# Patient Record
Sex: FEMALE | Race: WHITE | NOT HISPANIC OR LATINO | Employment: OTHER | URBAN - METROPOLITAN AREA
[De-identification: names, ages, dates, MRNs, and addresses within clinical notes are randomized per-mention and may not be internally consistent; named-entity substitution may affect disease eponyms.]

---

## 2017-05-05 ENCOUNTER — NEW PATIENT (OUTPATIENT)
Dept: URBAN - METROPOLITAN AREA CLINIC 26 | Facility: CLINIC | Age: 82
End: 2017-05-05

## 2017-05-05 VITALS
HEART RATE: 60 BPM | BODY MASS INDEX: 24.14 KG/M2 | HEIGHT: 58 IN | DIASTOLIC BLOOD PRESSURE: 72 MMHG | SYSTOLIC BLOOD PRESSURE: 112 MMHG | WEIGHT: 115 LBS

## 2017-05-05 DIAGNOSIS — H35.61: ICD-10-CM

## 2017-05-05 DIAGNOSIS — H25.9: ICD-10-CM

## 2017-05-05 DIAGNOSIS — H04.123: ICD-10-CM

## 2017-05-05 DIAGNOSIS — H35.3122: ICD-10-CM

## 2017-05-05 DIAGNOSIS — H43.393: ICD-10-CM

## 2017-05-05 DIAGNOSIS — H35.3211: ICD-10-CM

## 2017-05-05 PROCEDURE — 92004 COMPRE OPH EXAM NEW PT 1/>: CPT

## 2017-05-05 PROCEDURE — 92250 FUNDUS PHOTOGRAPHY W/I&R: CPT

## 2017-05-05 ASSESSMENT — TONOMETRY
OS_IOP_MMHG: 14
OD_IOP_MMHG: 10

## 2017-05-05 ASSESSMENT — VISUAL ACUITY
OD_SC: CF 1FT
OS_SC: 20/200

## 2017-05-09 ENCOUNTER — CLINICAL PROCEDURE AND DIAGNOSTIC TESTING ONLY (OUTPATIENT)
Dept: URBAN - METROPOLITAN AREA CLINIC 26 | Facility: CLINIC | Age: 82
End: 2017-05-09

## 2017-05-09 DIAGNOSIS — H35.3231: ICD-10-CM

## 2017-05-09 PROCEDURE — 92134 CPTRZ OPH DX IMG PST SGM RTA: CPT

## 2017-05-09 PROCEDURE — 67028 INJECTION EYE DRUG: CPT

## 2017-05-09 ASSESSMENT — TONOMETRY
OS_IOP_MMHG: 18
OD_IOP_MMHG: 14

## 2017-05-09 ASSESSMENT — VISUAL ACUITY
OS_SC: 20/200
OD_SC: CF 1FT

## 2017-06-02 ENCOUNTER — CLINIC PROCEDURE ONLY (OUTPATIENT)
Dept: URBAN - METROPOLITAN AREA CLINIC 26 | Facility: CLINIC | Age: 82
End: 2017-06-02

## 2017-06-02 DIAGNOSIS — H35.3231: ICD-10-CM

## 2017-06-02 PROCEDURE — 67028 INJECTION EYE DRUG: CPT

## 2017-06-02 ASSESSMENT — VISUAL ACUITY
OS_SC: 20/400
OD_SC: CF 3FT

## 2017-06-02 ASSESSMENT — TONOMETRY
OS_IOP_MMHG: 12
OD_IOP_MMHG: 13

## 2017-07-07 ENCOUNTER — CLINICAL PROCEDURE AND DIAGNOSTIC TESTING ONLY (OUTPATIENT)
Dept: URBAN - METROPOLITAN AREA CLINIC 26 | Facility: CLINIC | Age: 82
End: 2017-07-07

## 2017-07-07 DIAGNOSIS — H35.3231: ICD-10-CM

## 2017-07-07 PROCEDURE — 92134 CPTRZ OPH DX IMG PST SGM RTA: CPT

## 2017-07-07 PROCEDURE — 67028 INJECTION EYE DRUG: CPT

## 2017-07-07 ASSESSMENT — TONOMETRY
OD_IOP_MMHG: 12
OS_IOP_MMHG: 14

## 2017-07-07 ASSESSMENT — VISUAL ACUITY
OS_SC: 20/200
OD_SC: CF 1FT

## 2017-08-11 ENCOUNTER — FOLLOW UP (OUTPATIENT)
Dept: URBAN - METROPOLITAN AREA CLINIC 26 | Facility: CLINIC | Age: 82
End: 2017-08-11

## 2017-08-11 VITALS — HEIGHT: 55 IN | HEART RATE: 68 BPM | DIASTOLIC BLOOD PRESSURE: 84 MMHG | SYSTOLIC BLOOD PRESSURE: 122 MMHG

## 2017-08-11 DIAGNOSIS — H25.9: ICD-10-CM

## 2017-08-11 DIAGNOSIS — H04.123: ICD-10-CM

## 2017-08-11 DIAGNOSIS — H35.61: ICD-10-CM

## 2017-08-11 DIAGNOSIS — H43.393: ICD-10-CM

## 2017-08-11 DIAGNOSIS — H35.3231: ICD-10-CM

## 2017-08-11 PROCEDURE — 92014 COMPRE OPH EXAM EST PT 1/>: CPT

## 2017-08-11 PROCEDURE — 92250 FUNDUS PHOTOGRAPHY W/I&R: CPT

## 2017-08-11 ASSESSMENT — TONOMETRY
OD_IOP_MMHG: 12
OS_IOP_MMHG: 14

## 2017-08-11 ASSESSMENT — VISUAL ACUITY: OS_SC: 20/400

## 2017-09-08 ENCOUNTER — CLINICAL PROCEDURE AND DIAGNOSTIC TESTING ONLY (OUTPATIENT)
Dept: URBAN - METROPOLITAN AREA CLINIC 26 | Facility: CLINIC | Age: 82
End: 2017-09-08

## 2017-09-08 DIAGNOSIS — H35.3231: ICD-10-CM

## 2017-09-08 PROCEDURE — 92134 CPTRZ OPH DX IMG PST SGM RTA: CPT

## 2017-09-08 PROCEDURE — 67028 INJECTION EYE DRUG: CPT

## 2017-09-08 ASSESSMENT — TONOMETRY
OS_IOP_MMHG: 12
OD_IOP_MMHG: 11

## 2017-09-08 ASSESSMENT — VISUAL ACUITY: OS_SC: 20/200

## 2017-11-09 ENCOUNTER — CLINICAL PROCEDURE AND DIAGNOSTIC TESTING ONLY (OUTPATIENT)
Dept: URBAN - METROPOLITAN AREA CLINIC 26 | Facility: CLINIC | Age: 82
End: 2017-11-09

## 2017-11-09 DIAGNOSIS — H35.3231: ICD-10-CM

## 2017-11-09 PROCEDURE — 67028 INJECTION EYE DRUG: CPT

## 2017-11-09 PROCEDURE — 92134 CPTRZ OPH DX IMG PST SGM RTA: CPT

## 2017-11-09 ASSESSMENT — VISUAL ACUITY
OS_SC: 20/80-1
OD_SC: CF 1FT

## 2017-11-09 ASSESSMENT — TONOMETRY
OS_IOP_MMHG: 13
OD_IOP_MMHG: 11

## 2018-01-12 ENCOUNTER — CLINICAL PROCEDURE AND DIAGNOSTIC TESTING ONLY (OUTPATIENT)
Dept: URBAN - METROPOLITAN AREA CLINIC 26 | Facility: CLINIC | Age: 83
End: 2018-01-12

## 2018-01-12 DIAGNOSIS — H35.3231: ICD-10-CM

## 2018-01-12 PROCEDURE — 92250 FUNDUS PHOTOGRAPHY W/I&R: CPT

## 2018-01-12 PROCEDURE — 67028 INJECTION EYE DRUG: CPT

## 2018-01-12 ASSESSMENT — TONOMETRY
OS_IOP_MMHG: 14
OD_IOP_MMHG: 13

## 2018-01-12 ASSESSMENT — VISUAL ACUITY
OD_SC: CF 2FT
OS_SC: 20/200

## 2018-03-16 ENCOUNTER — FOLLOW UP AND POST INJECTION EVALUATION (OUTPATIENT)
Dept: URBAN - METROPOLITAN AREA CLINIC 26 | Facility: CLINIC | Age: 83
End: 2018-03-16

## 2018-03-16 VITALS
BODY MASS INDEX: 22.58 KG/M2 | DIASTOLIC BLOOD PRESSURE: 80 MMHG | HEART RATE: 75 BPM | WEIGHT: 115 LBS | SYSTOLIC BLOOD PRESSURE: 158 MMHG | HEIGHT: 60 IN

## 2018-03-16 DIAGNOSIS — H35.3231: ICD-10-CM

## 2018-03-16 DIAGNOSIS — H35.61: ICD-10-CM

## 2018-03-16 DIAGNOSIS — H43.393: ICD-10-CM

## 2018-03-16 PROCEDURE — 92250 FUNDUS PHOTOGRAPHY W/I&R: CPT

## 2018-03-16 PROCEDURE — 92014 COMPRE OPH EXAM EST PT 1/>: CPT

## 2018-03-16 PROCEDURE — 92134 CPTRZ OPH DX IMG PST SGM RTA: CPT

## 2018-03-16 ASSESSMENT — TONOMETRY
OD_IOP_MMHG: 11
OS_IOP_MMHG: 15

## 2018-03-16 ASSESSMENT — VISUAL ACUITY
OS_SC: 20/200+2
OD_SC: CF 1FT

## 2018-05-04 ENCOUNTER — FOLLOW UP (OUTPATIENT)
Dept: URBAN - METROPOLITAN AREA CLINIC 26 | Facility: CLINIC | Age: 83
End: 2018-05-04

## 2018-05-04 VITALS — HEART RATE: 71 BPM | HEIGHT: 55 IN | DIASTOLIC BLOOD PRESSURE: 60 MMHG

## 2018-05-04 DIAGNOSIS — H43.393: ICD-10-CM

## 2018-05-04 DIAGNOSIS — H35.61: ICD-10-CM

## 2018-05-04 DIAGNOSIS — H35.3231: ICD-10-CM

## 2018-05-04 PROCEDURE — 92250 FUNDUS PHOTOGRAPHY W/I&R: CPT

## 2018-05-04 PROCEDURE — 92012 INTRM OPH EXAM EST PATIENT: CPT

## 2018-05-04 ASSESSMENT — VISUAL ACUITY: OS_SC: 20/200+1

## 2018-05-04 ASSESSMENT — TONOMETRY
OS_IOP_MMHG: 15
OD_IOP_MMHG: 12

## 2018-07-06 ENCOUNTER — FOLLOW UP (OUTPATIENT)
Dept: URBAN - METROPOLITAN AREA CLINIC 26 | Facility: CLINIC | Age: 83
End: 2018-07-06

## 2018-07-06 DIAGNOSIS — H35.3231: ICD-10-CM

## 2018-07-06 DIAGNOSIS — H43.393: ICD-10-CM

## 2018-07-06 DIAGNOSIS — H35.61: ICD-10-CM

## 2018-07-06 PROCEDURE — 92014 COMPRE OPH EXAM EST PT 1/>: CPT

## 2018-07-06 PROCEDURE — 92250 FUNDUS PHOTOGRAPHY W/I&R: CPT

## 2018-07-06 ASSESSMENT — VISUAL ACUITY
OD_SC: CF 2FT
OS_SC: 20/200

## 2018-07-06 ASSESSMENT — TONOMETRY
OD_IOP_MMHG: 12
OS_IOP_MMHG: 14

## 2018-10-05 ENCOUNTER — FOLLOW UP (OUTPATIENT)
Dept: URBAN - METROPOLITAN AREA CLINIC 26 | Facility: CLINIC | Age: 83
End: 2018-10-05

## 2018-10-05 DIAGNOSIS — H43.393: ICD-10-CM

## 2018-10-05 DIAGNOSIS — H35.3231: ICD-10-CM

## 2018-10-05 DIAGNOSIS — H35.61: ICD-10-CM

## 2018-10-05 PROCEDURE — 92250 FUNDUS PHOTOGRAPHY W/I&R: CPT

## 2018-10-05 PROCEDURE — 92014 COMPRE OPH EXAM EST PT 1/>: CPT

## 2018-10-05 ASSESSMENT — TONOMETRY
OD_IOP_MMHG: 10
OS_IOP_MMHG: 12

## 2018-10-05 ASSESSMENT — VISUAL ACUITY: OS_SC: 20/200-1

## 2019-02-21 ENCOUNTER — FOLLOW UP (OUTPATIENT)
Dept: URBAN - METROPOLITAN AREA CLINIC 26 | Facility: CLINIC | Age: 84
End: 2019-02-21

## 2019-02-21 VITALS — BODY MASS INDEX: 20.62 KG/M2 | HEIGHT: 60 IN | WEIGHT: 105 LBS

## 2019-02-21 DIAGNOSIS — H25.9: ICD-10-CM

## 2019-02-21 DIAGNOSIS — H43.393: ICD-10-CM

## 2019-02-21 DIAGNOSIS — H35.3231: ICD-10-CM

## 2019-02-21 DIAGNOSIS — H35.61: ICD-10-CM

## 2019-02-21 DIAGNOSIS — H04.123: ICD-10-CM

## 2019-02-21 PROCEDURE — 92250 FUNDUS PHOTOGRAPHY W/I&R: CPT

## 2019-02-21 PROCEDURE — 67028 INJECTION EYE DRUG: CPT

## 2019-02-21 PROCEDURE — 92014 COMPRE OPH EXAM EST PT 1/>: CPT

## 2019-02-21 PROCEDURE — 92134 CPTRZ OPH DX IMG PST SGM RTA: CPT

## 2019-02-21 ASSESSMENT — TONOMETRY
OS_IOP_MMHG: 14
OD_IOP_MMHG: 11

## 2019-02-21 ASSESSMENT — VISUAL ACUITY: OS_SC: 20/200

## 2019-06-04 ENCOUNTER — FOLLOW UP (OUTPATIENT)
Dept: URBAN - METROPOLITAN AREA CLINIC 26 | Facility: CLINIC | Age: 84
End: 2019-06-04

## 2019-06-04 DIAGNOSIS — H35.61: ICD-10-CM

## 2019-06-04 DIAGNOSIS — H35.3231: ICD-10-CM

## 2019-06-04 DIAGNOSIS — H43.393: ICD-10-CM

## 2019-06-04 PROCEDURE — 92014 COMPRE OPH EXAM EST PT 1/>: CPT

## 2019-06-04 PROCEDURE — 92250 FUNDUS PHOTOGRAPHY W/I&R: CPT

## 2019-06-04 ASSESSMENT — VISUAL ACUITY: OS_SC: 20/200-2

## 2019-06-04 ASSESSMENT — TONOMETRY
OS_IOP_MMHG: 11
OD_IOP_MMHG: 10

## 2019-09-24 ENCOUNTER — FOLLOW UP (OUTPATIENT)
Dept: URBAN - METROPOLITAN AREA CLINIC 26 | Facility: CLINIC | Age: 84
End: 2019-09-24

## 2019-09-24 DIAGNOSIS — H35.3231: ICD-10-CM

## 2019-09-24 DIAGNOSIS — H35.61: ICD-10-CM

## 2019-09-24 DIAGNOSIS — H43.393: ICD-10-CM

## 2019-09-24 PROCEDURE — 92014 COMPRE OPH EXAM EST PT 1/>: CPT

## 2019-09-24 PROCEDURE — 92250 FUNDUS PHOTOGRAPHY W/I&R: CPT

## 2019-09-24 ASSESSMENT — VISUAL ACUITY: OS_SC: 20/400+1

## 2019-09-24 ASSESSMENT — TONOMETRY
OS_IOP_MMHG: 14
OD_IOP_MMHG: 13

## 2022-01-07 NOTE — PATIENT DISCUSSION
Pt anxious and scared during exam today; significant variation in [place] and testing today. Binoc VA worse than mono VA, unable to measure near [place], but has no problems with homework/tablet, failed color vision.

## 2022-07-09 ENCOUNTER — TELEPHONE ENCOUNTER (OUTPATIENT)
Dept: URBAN - METROPOLITAN AREA CLINIC 121 | Facility: CLINIC | Age: 87
End: 2022-07-09

## 2022-07-10 ENCOUNTER — TELEPHONE ENCOUNTER (OUTPATIENT)
Dept: URBAN - METROPOLITAN AREA CLINIC 121 | Facility: CLINIC | Age: 87
End: 2022-07-10